# Patient Record
Sex: MALE
[De-identification: names, ages, dates, MRNs, and addresses within clinical notes are randomized per-mention and may not be internally consistent; named-entity substitution may affect disease eponyms.]

---

## 2020-03-09 ENCOUNTER — APPOINTMENT (OUTPATIENT)
Dept: BARIATRICS/WEIGHT MGMT | Facility: CLINIC | Age: 45
End: 2020-03-09
Payer: COMMERCIAL

## 2020-03-09 VITALS
HEART RATE: 82 BPM | OXYGEN SATURATION: 98 % | BODY MASS INDEX: 40.95 KG/M2 | DIASTOLIC BLOOD PRESSURE: 80 MMHG | SYSTOLIC BLOOD PRESSURE: 120 MMHG | HEIGHT: 73 IN | WEIGHT: 309 LBS

## 2020-03-09 DIAGNOSIS — M77.30 CALCANEAL SPUR, UNSPECIFIED FOOT: ICD-10-CM

## 2020-03-09 PROBLEM — Z00.00 ENCOUNTER FOR PREVENTIVE HEALTH EXAMINATION: Status: ACTIVE | Noted: 2020-03-09

## 2020-03-09 PROCEDURE — 94690 O2 UPTK REST INDIRECT: CPT

## 2020-03-09 PROCEDURE — 99205 OFFICE O/P NEW HI 60 MIN: CPT | Mod: 25

## 2020-03-09 NOTE — ASSESSMENT
[FreeTextEntry1] : Bariatric surgery history: gastric bypass 14 years ago\par Obesity co-morbidities: foot pain - heel spur, insulin resistance\par Comorbidities improved or resolved: n/a\par Anti-obesity medications:topiramate (started today)\par Obesity medication side effects: n/a\par \par Patient 44 y.o male, bypass >12 years ago, lost to follow up here for initial visit for weight management.  He has not had blood work in >5 years of any kind.  +heel spur, painful. \par \par - KORR Reevue indirect calorimeter done.  Results reviewed with patient.\par - labs ordered - post op santa, regular\par - discussed needs to take bariatric multivitamins. \par \par Extensive dietary counseling was provided:\par -three meal components emphasized: large portion vegetables/fruit, smaller portion protein favoring plant-based, smaller portion high fiber carbohydrates - > post op increased protein portion okay\par -properties of macronutrients were reviewed to help the patient understand why a balanced diet is important\par -discussed the avoidance of red and processed meat, sugar sweetened beverages, refined carbohydrates, high fat dairy - > he will try to decrease artificial sweetener, will stick to Optavia bars for now because they're easy. \par -advised avoidance of snack products and packaged / processed foods\par -counseled about meal timing and portion: large breakfast, medium lunch, small dinner - > very important and what he will focus on for next month\par -advised to avoid carbohydrates in evening if possible\par -regular water or seltzer throughout the day\par -for stimulus control, advised to keep unhealthy foods out of the house or out of view -> already does this. \par -recommended abstaining entirely from restaurant / fast food / take-out meals - > mostly avoids. \par - medication topiramate 25 for 1 week then 50mg daily. \par \par Lifestyle recommendations for weight loss were extensively reviewed\par -aerobic exercise reviewed: moderate intensity versus high intensity exercise - an estimate of daily / weekly time requirements was reviewed - > was planning to get gym membership again anyway, will obtain.\par -emphasized that resistance training in addition to aerobic may provide added benefit\par -emphasized that long term weight loss and maintenance depend upon exercise\par -the need for adequate sleep (6+ hours) was reviewed - he will consider MANAN screening in the future\par \par f/u 4 weeks\par \par \par \par
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
## 2020-03-09 NOTE — REASON FOR VISIT
[Initial Consultation] : an initial consultation for [Obesity] : obesity [FreeTextEntry2] : gastric bypass, insulin resistance

## 2020-03-09 NOTE — REVIEW OF SYSTEMS
[Patient Intake Form Reviewed] : Patient intake form was reviewed [MED-ROS-Musclo-FT] : +aches all over. +L heel pain

## 2020-03-10 LAB
25(OH)D3 SERPL-MCNC: 27.6 NG/ML
ALBUMIN SERPL ELPH-MCNC: 4.5 G/DL
ALP BLD-CCNC: 93 U/L
ALT SERPL-CCNC: 21 U/L
ANION GAP SERPL CALC-SCNC: 15 MMOL/L
AST SERPL-CCNC: 21 U/L
BASOPHILS # BLD AUTO: 0.02 K/UL
BASOPHILS NFR BLD AUTO: 0.4 %
BILIRUB SERPL-MCNC: 0.5 MG/DL
BUN SERPL-MCNC: 22 MG/DL
CALCIUM SERPL-MCNC: 9.5 MG/DL
CHLORIDE SERPL-SCNC: 103 MMOL/L
CHOLEST SERPL-MCNC: 192 MG/DL
CHOLEST/HDLC SERPL: 3.5 RATIO
CO2 SERPL-SCNC: 24 MMOL/L
CREAT SERPL-MCNC: 1.19 MG/DL
CRP SERPL HS-MCNC: 1.15 MG/L
EOSINOPHIL # BLD AUTO: 0.06 K/UL
EOSINOPHIL NFR BLD AUTO: 1.1 %
ESTIMATED AVERAGE GLUCOSE: 114 MG/DL
FERRITIN SERPL-MCNC: 22 NG/ML
FOLATE SERPL-MCNC: 18 NG/ML
GLUCOSE SERPL-MCNC: 105 MG/DL
HBA1C MFR BLD HPLC: 5.6 %
HCT VFR BLD CALC: 46.4 %
HDLC SERPL-MCNC: 54 MG/DL
HGB BLD-MCNC: 15 G/DL
IMM GRANULOCYTES NFR BLD AUTO: 0.2 %
INSULIN P FAST SERPL-ACNC: 7.1 UU/ML
IRON SATN MFR SERPL: 20 %
IRON SERPL-MCNC: 70 UG/DL
LDLC SERPL CALC-MCNC: 114 MG/DL
LYMPHOCYTES # BLD AUTO: 1.6 K/UL
LYMPHOCYTES NFR BLD AUTO: 28 %
MAN DIFF?: NORMAL
MCHC RBC-ENTMCNC: 30.1 PG
MCHC RBC-ENTMCNC: 32.3 GM/DL
MCV RBC AUTO: 93.2 FL
MONOCYTES # BLD AUTO: 0.36 K/UL
MONOCYTES NFR BLD AUTO: 6.3 %
NEUTROPHILS # BLD AUTO: 3.66 K/UL
NEUTROPHILS NFR BLD AUTO: 64 %
PLATELET # BLD AUTO: 321 K/UL
POTASSIUM SERPL-SCNC: 4.4 MMOL/L
PROT SERPL-MCNC: 7.5 G/DL
RBC # BLD: 4.98 M/UL
RBC # FLD: 12.1 %
SODIUM SERPL-SCNC: 141 MMOL/L
T3FREE SERPL-MCNC: 2.82 PG/ML
T4 FREE SERPL-MCNC: 1.4 NG/DL
TIBC SERPL-MCNC: 353 UG/DL
TRIGL SERPL-MCNC: 120 MG/DL
TSH SERPL-ACNC: 1.41 UIU/ML
UIBC SERPL-MCNC: 282 UG/DL
VIT B12 SERPL-MCNC: 373 PG/ML
WBC # FLD AUTO: 5.71 K/UL

## 2020-03-12 LAB
COPPER SERPL-MCNC: 105 UG/DL
METHYLMALONATE SERPL-SCNC: 219 NMOL/L
ZINC SERPL-MCNC: 78 UG/DL

## 2020-03-13 LAB
VIT A SERPL-MCNC: 43.8 UG/DL
VIT B1 SERPL-MCNC: 118.4 NMOL/L
VIT B6 SERPL-MCNC: 15.6 UG/L

## 2020-03-30 ENCOUNTER — APPOINTMENT (OUTPATIENT)
Dept: BARIATRICS/WEIGHT MGMT | Facility: CLINIC | Age: 45
End: 2020-03-30
Payer: COMMERCIAL

## 2020-03-30 PROCEDURE — G2012 BRIEF CHECK IN BY MD/QHP: CPT

## 2020-06-28 ENCOUNTER — RX RENEWAL (OUTPATIENT)
Age: 45
End: 2020-06-28

## 2020-08-19 ENCOUNTER — APPOINTMENT (OUTPATIENT)
Dept: BARIATRICS/WEIGHT MGMT | Facility: CLINIC | Age: 45
End: 2020-08-19
Payer: COMMERCIAL

## 2020-08-19 VITALS — WEIGHT: 282 LBS | BODY MASS INDEX: 37.21 KG/M2

## 2020-08-19 PROCEDURE — 99214 OFFICE O/P EST MOD 30 MIN: CPT | Mod: 95

## 2020-08-19 RX ORDER — TOPIRAMATE 50 MG/1
50 TABLET, FILM COATED ORAL DAILY
Qty: 90 | Refills: 0 | Status: ACTIVE | COMMUNITY
Start: 2020-03-09 | End: 1900-01-01

## 2020-08-19 NOTE — HISTORY OF PRESENT ILLNESS
[Home] : at home, [unfilled] , at the time of the visit. [Medical Office: ([facility])___] : at the medical office located in  [Verbal consent obtained from patient] : the patient, [unfilled] [FreeTextEntry1] : Mr. LUDA KEATING is a 44 year year old male who presents for evaluation and treatment of Class 3 obesity. \par \par Obesity related co-morbidities: foot pain - heel spur, insulin resistance\par \par Interim\par - now is working "even more" - 7 days a week, .  "does not foresee this changing" in the near future\par - cut out significant snacking\par - "tripled up" on vegetables\par - weight - has seen 27# weight loss since March - now 282#.  Unsure if this is due to stress or other factors but he has been positively making changes.\par \par Meds\par - takes topiramate 50mg 2 times a week around 4pm.  no negative side effects. \par \par Dietary\par - still doing Optavia, "easy to use the bars"\par - egg white in the morning - has some time to do so\par - last meal of the day around 7pm. \par \par Physical activity\par Work 7 days a week, working until 8pm.  Last week or two has been more physical labor, sometimes sitting at a computer all day.  Is not getting his activity during those periods.  \par \par Water\par - increasing water intake - 64 oz per day.\par - cut back on coffee intake as well.

## 2020-08-19 NOTE — ASSESSMENT
[FreeTextEntry1] : Patient 44 y.o male, bypass >12 years ago, lost to follow up here for initial visit for weight management. He has not had blood work in >5 years of any kind. +heel spur, painful. \par \par - would like to f/u with RDN - told him to do a food journal  before that visit \par - refilled topiramate\par - continue vegetable intake and water intake, not ready to transition off optavia bars "just so easy for me right now"\par - discussed needs to take bariatric multivitamins. \par - in the future he would consider coming into [facility] for in person f/u \par \par -properties of macronutrients were reviewed to help the patient understand why a balanced diet is important\par -discussed the avoidance of red and processed meat, sugar sweetened beverages, refined carbohydrates, high fat dairy - > he will try to decrease artificial sweetener, will stick to Optavia bars for now because they're easy. \par -advised avoidance of snack products and packaged / processed foods\par \par -the need for adequate sleep (6+ hours) was reviewed - he will consider MANAN screening in the future\par \par f/u 3 weeks - TEB\par \par Bariatric surgery history: gastric bypass 14 years ago\par Obesity co-morbidities: foot pain - heel spur, insulin resistance\par Comorbidities improved or resolved: n/a\par Anti-obesity medications:topiramate \par Obesity medication side effects: none

## 2020-09-09 ENCOUNTER — APPOINTMENT (OUTPATIENT)
Dept: BARIATRICS/WEIGHT MGMT | Facility: CLINIC | Age: 45
End: 2020-09-09
Payer: COMMERCIAL

## 2020-09-09 VITALS — BODY MASS INDEX: 37.87 KG/M2 | WEIGHT: 287 LBS

## 2020-09-09 DIAGNOSIS — R79.0 ABNORMAL LVL OF BLOOD MINERAL: ICD-10-CM

## 2020-09-09 DIAGNOSIS — Z13.29 ENCOUNTER FOR SCREENING FOR OTHER SUSPECTED ENDOCRINE DISORDER: ICD-10-CM

## 2020-09-09 DIAGNOSIS — Z13.0 ENCOUNTER FOR SCREENING FOR OTHER SUSPECTED ENDOCRINE DISORDER: ICD-10-CM

## 2020-09-09 DIAGNOSIS — Z13.21 ENCOUNTER FOR SCREENING FOR OTHER SUSPECTED ENDOCRINE DISORDER: ICD-10-CM

## 2020-09-09 DIAGNOSIS — Z13.228 ENCOUNTER FOR SCREENING FOR OTHER SUSPECTED ENDOCRINE DISORDER: ICD-10-CM

## 2020-09-09 PROCEDURE — 99214 OFFICE O/P EST MOD 30 MIN: CPT | Mod: 95

## 2020-09-09 NOTE — ASSESSMENT
[FreeTextEntry1] : Patient 44 y.o male, bypass >12 years ago, lost to follow up here for initial visit for weight management. He has not had blood work in >5 years of any kind. +heel spur, painful. \par \par - adding vegetables/beans like black beans to his breakfast \par - unable to shift lunch and dinner times, which are not scheduled.  Discussed bringing packed lunch and dinner, patient hesitant to do so at this time\par -f/u RDN\par - continue topiramate\par - continue vegetable intake and water intake, not ready to transition off optavia bars "just so easy for me right now"\par - discussed needs to take bariatric multivitamins. \par - in the future he would consider coming into [place] office for in person f/u \par \par -properties of macronutrients were reviewed to help the patient understand why a balanced diet is important\par -discussed the avoidance of red and processed meat, sugar sweetened beverages, refined carbohydrates, high fat dairy - > he will try to decrease artificial sweetener, will stick to Optavia bars for now because they're easy. \par -advised avoidance of snack products and packaged / processed foods\par \par -the need for adequate sleep (6+ hours) was reviewed - he will consider MANAN screening in the future\par \par f/u 3 weeks - TEB\par \par Bariatric surgery history: gastric bypass 14 years ago\par Obesity co-morbidities: foot pain - heel spur, insulin resistance\par Comorbidities improved or resolved: n/a\par Anti-obesity medications:topiramate \par Obesity medication side effects: none

## 2020-09-09 NOTE — HISTORY OF PRESENT ILLNESS
[Home] : at home, [unfilled] , at the time of the visit. [Medical Office: ([facility])___] : at the medical office located in  [Verbal consent obtained from patient] : the patient, [unfilled] [FreeTextEntry1] : Mr. LUDA KEATING is a 44 year year old male who presents for evaluation and treatment of Class 3 obesity. \par +gastric sleeve surgery patient\par Obesity related co-morbidities: foot pain - heel spur, insulin resistance, vit d def, low ferritin\par \par Interim\par - has been snacking after coming home.  So he eats every morning now and that helps - egg whites and turkey or on a wrap. consistent - every day.  lunch is varied "on the go" - been having sushi b/c that's close by\par - says that the days he does house work and goes to bed early he loses the most weight - as opposed to "relaxing" and then ending up eating something\par - putting less splenda in his coffee - went from 4 to 1 packet he's proud of that.\par - now is working "even more" - 7 days a week, .  "does not foresee this changing" in the near future\par \par Meds\par - takes topiramate 50mg daily now, thinks it may be helping with appetite\par \par Dietary\par - still doing Optavia, "easy to use the bars" - not using them daily, but uses them intermittently. \par - last meal of the day around 7pm. \par - has been increasing snacks per above\par \par Physical activity\par Work 7 days a week, wakes up 4am and working until 8pm.  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 \par Avoiding sitting and watching TV as much, riding bike sometimes, not consistent "going to get colder soon"\par \par Water\par - increasing water intake - 64 oz per day.\par - cut back on coffee intake as well.

## 2020-10-07 ENCOUNTER — APPOINTMENT (OUTPATIENT)
Dept: BARIATRICS/WEIGHT MGMT | Facility: CLINIC | Age: 45
End: 2020-10-07
Payer: COMMERCIAL

## 2020-10-07 VITALS — BODY MASS INDEX: 36.15 KG/M2 | WEIGHT: 274 LBS

## 2020-10-07 DIAGNOSIS — E66.01 MORBID (SEVERE) OBESITY DUE TO EXCESS CALORIES: ICD-10-CM

## 2020-10-07 DIAGNOSIS — E88.81 METABOLIC SYNDROME: ICD-10-CM

## 2020-10-07 DIAGNOSIS — E55.9 VITAMIN D DEFICIENCY, UNSPECIFIED: ICD-10-CM

## 2020-10-07 DIAGNOSIS — Z98.84 BARIATRIC SURGERY STATUS: ICD-10-CM

## 2020-10-07 DIAGNOSIS — E78.00 PURE HYPERCHOLESTEROLEMIA, UNSPECIFIED: ICD-10-CM

## 2020-10-07 PROCEDURE — 99214 OFFICE O/P EST MOD 30 MIN: CPT | Mod: 95

## 2020-10-07 NOTE — HISTORY OF PRESENT ILLNESS
[Home] : at home, [unfilled] , at the time of the visit. [Medical Office: ([facility])___] : at the medical office located in  [Verbal consent obtained from patient] : the patient, [unfilled] [FreeTextEntry1] : Mr. LUDA KEATING is a 44 year year old male who presents for evaluation and treatment of Class 3 obesity. \par +gastric sleeve surgery patient\par Obesity related co-morbidities: foot pain - heel spur, insulin resistance, vit d def, low ferritin\par \par Weight - 274#.   Initial 309 in March. 35# weight loss. 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  [place] he packed on pounds very easily when eating/drinking alcohol.  Now back to the grind and seeing weight back to previous baseline. \par - walking a lot more.  Has been adding vegetables more.  adding veggies to egg sandwich. \par - taking dog for a walk - 1 hr walk.  before dinner.  Light jog b/c dog likes to walk fast.\par - cooking more meals at  home on weekends.\par - has been snacking after coming home.  So he eats every morning now and that helps - egg whites and turkey or on a wrap. consistent - every day.  lunch is varied "on the go" - been having sushi b/c that's close by.  \par - putting less splenda in his coffee - went from 4 to 1 packet he's proud of that.\par - now is working "even more" - 7 days a week, .  "does not foresee this changing" in the near future\par \par Meds\par - takes topiramate 50mg daily now, thinks it may be helping with appetite\par \par Dietary\par - still doing Optavia, "easy to use the bars" - daily, about 3 - 10am, 12pm and around 1pm when he is hungry.\par - last meal of the day around 4pm. \par - has been increasing snacks per above\par \par Physical activity\par 1 hr walking dog every day, brisk walk.\par Avoiding sitting and watching TV as much, riding bike sometimes, not consistent "going to get colder soon"\par \par Water\par - increasing water intake - 64 oz per day.\par - cut back on coffee intake as well.

## 2020-10-07 NOTE — ASSESSMENT
[FreeTextEntry1] : Patient 44 y.o male, bypass >12 years ago, lost to follow up here for initial visit for weight management. He has not had blood work in >5 years of any kind. +heel spur, painful. \par \par - continue topiramate.  Could consider GLP-1 oral or SQ \par - adding vegetables/beans like black beans to his day - wanting to improve on that evening meal which his early dinner. \par - having bars during the day and having early dinner at 4pm which he gets take out, sushi veg and salad.  Discussed bringing packed lunch and dinner, patient hesitant to do so at this time but open to ideas, will have him talk to RDN\par - continue topiramate\par - continue vegetable intake and water intake\par - discussed needs to take bariatric multivitamins. \par - in the future he would consider coming into [place] office for in person f/u \par \par -the need for adequate sleep (6+ hours) was reviewed - he will consider MANAN screening in the future\par \par f/u 4 weeks - in person\par \par Bariatric surgery history: gastric bypass 14 years ago\par Obesity co-morbidities: foot pain - heel spur, insulin resistance\par Comorbidities improved or resolved: n/a\par Anti-obesity medications:topiramate \par Obesity medication side effects: none

## 2020-12-14 NOTE — REASON FOR VISIT
[de-identified] : 10 x 8 cm soft tissue tumor mass of posterior lower neck/ shouder girdle area, possibe deep seated lipoma [Midline] : located in midline position [Normal] : orientation to person, place, and time: normal [Follow-Up Visit] : a follow-up visit for [Obesity] : obesity

## 2024-08-26 ENCOUNTER — APPOINTMENT (OUTPATIENT)
Dept: PODIATRY | Facility: CLINIC | Age: 49
End: 2024-08-26
Payer: COMMERCIAL

## 2024-08-26 VITALS — WEIGHT: 315 LBS | HEIGHT: 74 IN | BODY MASS INDEX: 40.43 KG/M2

## 2024-08-26 DIAGNOSIS — Q82.8 OTHER SPECIFIED CONGENITAL MALFORMATIONS OF SKIN: ICD-10-CM

## 2024-08-26 DIAGNOSIS — L60.0 INGROWING NAIL: ICD-10-CM

## 2024-08-26 DIAGNOSIS — B35.1 TINEA UNGUIUM: ICD-10-CM

## 2024-08-26 DIAGNOSIS — M79.672 PAIN IN LEFT FOOT: ICD-10-CM

## 2024-08-26 DIAGNOSIS — L85.3 XEROSIS CUTIS: ICD-10-CM

## 2024-08-26 PROCEDURE — 99204 OFFICE O/P NEW MOD 45 MIN: CPT | Mod: 25

## 2024-08-26 PROCEDURE — 11720 DEBRIDE NAIL 1-5: CPT

## 2024-08-26 RX ORDER — UREA 20 %
20 CREAM (GRAM) TOPICAL DAILY
Qty: 1 | Refills: 0 | Status: ACTIVE | COMMUNITY
Start: 2024-08-26 | End: 1900-01-01

## 2024-08-26 RX ORDER — CLOTRIMAZOLE 10 MG/ML
1 SOLUTION TOPICAL
Qty: 60 | Refills: 3 | Status: ACTIVE | COMMUNITY
Start: 2024-08-26 | End: 1900-01-01

## 2024-08-26 NOTE — PHYSICAL EXAM
[General Appearance - Alert] : alert [General Appearance - In No Acute Distress] : in no acute distress [2+] : left foot dorsalis pedis 2+ [Sensation] : the sensory exam was normal to light touch and pinprick [Ankle Swelling (On Exam)] : not present [FreeTextEntry1] : Hyperkeratosis IPk submet 1 of left foot. left heel xerosis. Left and right hallux with mild medial and lateral ingrown toenails. No erythema and edema it is just distal nail impingement. There is thickened discolored nail with subungual debris of the left hallux and 5th toenail with tenderness to palpation

## 2024-08-26 NOTE — ASSESSMENT
[FreeTextEntry1] : #IPK/Porokeratosis Left submet 1 - Aseptic debridement of hyperkeratotic lesion performed with a #15 blade.  - Rx Urea cream - I dispensed U offloading pads to area  #Xerosis - Rx Urea Cream apply BID  #Onychomycosis/ingrown toenail bilateral hallux  - Aseptic debridement performed of left hallux and left 5th thickened mycotic nail in both thickness and length. Tolerated well - Rx clotrimazole solution apply BID, lysol shoes. Advised may take 12-18 months to see full improvement to toenail  PTR 6 weeks  Dupixent Counseling: I discussed with the patient the risks of dupilumab including but not limited to eye infection and irritation, cold sores, injection site reactions, worsening of asthma, allergic reactions and increased risk of parasitic infection.  Live vaccines should be avoided while taking dupilumab. Dupilumab will also interact with certain medications such as warfarin and cyclosporine. The patient understands that monitoring is required and they must alert us or the primary physician if symptoms of infection or other concerning signs are noted.

## 2024-10-02 ENCOUNTER — APPOINTMENT (OUTPATIENT)
Dept: PODIATRY | Facility: CLINIC | Age: 49
End: 2024-10-02